# Patient Record
Sex: FEMALE | Race: WHITE
[De-identification: names, ages, dates, MRNs, and addresses within clinical notes are randomized per-mention and may not be internally consistent; named-entity substitution may affect disease eponyms.]

---

## 2019-09-30 ENCOUNTER — HOSPITAL ENCOUNTER (EMERGENCY)
Dept: HOSPITAL 74 - JER | Age: 24
LOS: 1 days | Discharge: HOME | End: 2019-10-01
Payer: SELF-PAY

## 2019-09-30 VITALS — TEMPERATURE: 98.6 F | DIASTOLIC BLOOD PRESSURE: 71 MMHG | SYSTOLIC BLOOD PRESSURE: 128 MMHG

## 2019-09-30 VITALS — BODY MASS INDEX: 28.3 KG/M2

## 2019-09-30 DIAGNOSIS — D27.0: Primary | ICD-10-CM

## 2019-09-30 NOTE — PDOC
History of Present Illness





- General


Chief Complaint: Pain


Stated Complaint: RT ABD PAIN


Time Seen by Provider: 09/30/19 23:18





- History of Present Illness


Initial Comments: 





09/30/19 23:28


23yo F no PMH presents from home c/o 2hrs sudden onset at rest sharp RLQ pain 

nonradiating constant, not better or worse with anything, no pain medications 

tried, atraumatic. Never sexually active, no ob/gyn, never had a pelvic exam, 

no PCP. LMP 2 weeks ago but then appears to have started again today, endorses 

vaginal bleeding filling 1 normal pad today with 1 blood clot, bleeding appears 

similar to other menses but pt has always had menses 4wks apart. Pt has never 

had cramping or pain with menses. Denies vaginal discharge, genital rash or 

irritation, fever, chills, fatigue, headache, dizziness, numbness/tingling, 

weakness, vision changes, shortness of breath, cough, chest pain, palpitations, 

leg swelling, blood in stool, diarrhea, constipation, dysuria, hematuria, 

confusion, sick contacts, recent travel.





Past History





- Past Medical History


Allergies/Adverse Reactions: 


 Allergies











Allergy/AdvReac Type Severity Reaction Status Date / Time


 


No Known Allergies Allergy   Verified 10/01/19 00:41














- Psycho Social/Smoking Cessation Hx


Smoking History: Never smoked





**Review of Systems





- Review of Systems


Comments:: 





10/01/19 02:19





Constitutional: Negative for chills, fever, fatigue, diaphoresis.


HENT: Negative for sore throat, rhinorrhea, congestion.


Eyes: Negative for visual disturbance.


Respiratory: Negative for shortness of breath, cough, and wheezing.


Cardiovascular: Negative for chest pain, palpitations, and leg swelling.


Gastrointestinal: Positive for abdominal pain, nausea, vomiting. Negative for 

abdominal pain, blood in stool, constipation, diarrhea.


Genitourinary: Positive for vaginal bleeding. Negative for dysuria, flank pain, 

and hematuria.


Musculoskeletal: Negative for myalgias, back pain, and neck pain.


Skin: Negative for rash.


Neurological: Negative for light-headedness, dizziness, vertigo, syncope, 

weakness, numbness and headaches.


Psychiatric/Behavioral: Negative for behavioral problems and confusion.








*Physical Exam





- Vital Signs


 Last Vital Signs











Temp Pulse Resp BP Pulse Ox


 


 98.6 F   85   19   128/71   96 


 


 09/30/19 23:05  09/30/19 23:05  09/30/19 23:05  09/30/19 23:05  09/30/19 23:05














- Physical Exam


Comments: 





10/01/19 02:19


Gen: Alert, NAD, comfortable-appearing.


HEENT: PERRL, EOMI, MMM, NCAT. No conjunctival pallor. Sclera are non-icteric.


CV: Regular rate and rhythm. No murmurs, rubs, or gallops.


PULM: No resp distress. CTAB, no wheezes, rales, or rhonchi.


ABD: +RLQ TTP, +slight rebound tenderness, soft, ND, no guarding, no CVA 

tenderness.


BACK: No TTP of c/t/l-spine. No step-offs or deformities.


MSK: No bony deformities. 2+ pulses in all extremities.


NEURO: AAOx3. PERRL. No gross CN deficits. Strength and sensation grossly 

intact throughout.


EXTREMITIES: No cyanosis. No clubbing. No edema. No calf tenderness.


PSYCH: Normal mood and thought pattern.


SKIN: Warm and dry. Normal capillary refill. No rashes. No jaundice








ED Treatment Course





- LABORATORY


CBC & Chemistry Diagram: 


 10/01/19 00:35





 10/01/19 00:35





Medical Decision Making





- Medical Decision Making


09/30/19 23:28


23yo F no PMH presents from home c/o 2hrs sudden onset at rest sharp RLQ pain 

nonradiating constant, not better or worse with anything, no pain medications 

tried, atraumatic. Never sexually active, no ob/gyn, never had a pelvic exam, 

no PCP. LMP 2 weeks ago but then appears to have started again today, endorses 

vaginal bleeding filling 1 normal pad today with 1 blood clot, bleeding appears 

similar to other menses but pt has always had menses 4wks apart. Pt has never 

had cramping or pain with menses. Denies vaginal discharge, genital rash or 

irritation, fever, chills, fatigue, headache, dizziness, numbness/tingling, 

weakness, vision changes, shortness of breath, cough, chest pain, palpitations, 

leg swelling, blood in stool, diarrhea, constipation, dysuria, hematuria, 

confusion, sick contacts, recent travel.





Hemodynamically stable, afebrile, +RLQ pain and TTP with slight rebound 

tenderness. Concern for R ovarian pathology such as ovarian torsion or ruptured 

ovarian cyst - r/o with TVUS. No concern for ectopic pregnancy at this time due 

to virginity - obtain UPreg and reconsider if positive. No concern for STIs or 

PID at this time due to lack of vaginal discharge or irritation and virginity. 

RLQ pain and TTP also concerning for appendicitis or other GI pathology - r/o 

with labs and CTAP. Also consider other infectious etiologies such as UTI, 

anemia, or metabolic derangements - r/o with labs.





-CBC, CMP, Lipase, UA/UC/UPreg


-Tylenol for pain


-TVUS, CTAP w/IV contrast


-Dispo: pending w/u and reassessment





09/30/19 23:58


Tylenol given.


US cannot do transvaginal due to virginity. Cannot see R ovary transabdominally 

- will give IVF and try again. US will assess for appendicitis now.





10/01/19 02:10


Pt feeling better s/p tylenol.


Labs reviewed. Of note, WBC 13.9.





10/01/19 02:20


Signed out to Dr Cruz. Pending urine, US read, and CTAP to r/o ovarian 

pathology and appendicitis.








Discharge





- Follow up/Referral


Referrals: 


ON STAFF,NOT [Primary Care Provider] - 





- Patient Discharge Instructions





- Post Discharge Activity

## 2019-10-01 VITALS — HEART RATE: 86 BPM

## 2019-10-01 LAB
ALBUMIN SERPL-MCNC: 4 G/DL (ref 3.4–5)
ALP SERPL-CCNC: 96 U/L (ref 45–117)
ALT SERPL-CCNC: 38 U/L (ref 13–61)
ANION GAP SERPL CALC-SCNC: 9 MMOL/L (ref 8–16)
APPEARANCE UR: CLEAR
AST SERPL-CCNC: 17 U/L (ref 15–37)
BACTERIA # UR AUTO: 44.4 /HPF
BASOPHILS # BLD: 0.6 % (ref 0–2)
BILIRUB SERPL-MCNC: 0.3 MG/DL (ref 0.2–1)
BILIRUB UR STRIP.AUTO-MCNC: NEGATIVE MG/DL
BUN SERPL-MCNC: 12.3 MG/DL (ref 7–18)
CALCIUM SERPL-MCNC: 9.3 MG/DL (ref 8.5–10.1)
CASTS URNS QL MICRO: 1 /LPF (ref 0–8)
CHLORIDE SERPL-SCNC: 107 MMOL/L (ref 98–107)
CO2 SERPL-SCNC: 25 MMOL/L (ref 21–32)
COLOR UR: YELLOW
CREAT SERPL-MCNC: 0.8 MG/DL (ref 0.55–1.3)
DEPRECATED RDW RBC AUTO: 13.4 % (ref 11.6–15.6)
EOSINOPHIL # BLD: 0.1 % (ref 0–4.5)
EPITH CASTS URNS QL MICRO: 2.3 /HPF
GLUCOSE SERPL-MCNC: 146 MG/DL (ref 74–106)
HCT VFR BLD CALC: 39.2 % (ref 32.4–45.2)
HGB BLD-MCNC: 13.2 GM/DL (ref 10.7–15.3)
KETONES UR QL STRIP: NEGATIVE
LEUKOCYTE ESTERASE UR QL STRIP.AUTO: NEGATIVE
LYMPHOCYTES # BLD: 13.4 % (ref 8–40)
MCH RBC QN AUTO: 29.1 PG (ref 25.7–33.7)
MCHC RBC AUTO-ENTMCNC: 33.6 G/DL (ref 32–36)
MCV RBC: 86.4 FL (ref 80–96)
MONOCYTES # BLD AUTO: 4.6 % (ref 3.8–10.2)
NEUTROPHILS # BLD: 81.3 % (ref 42.8–82.8)
NITRITE UR QL STRIP: NEGATIVE
PH UR: 6.5 [PH] (ref 5–8)
PLATELET # BLD AUTO: 338 K/MM3 (ref 134–434)
PMV BLD: 8.6 FL (ref 7.5–11.1)
POTASSIUM SERPLBLD-SCNC: 3.9 MMOL/L (ref 3.5–5.1)
PROT SERPL-MCNC: 7.9 G/DL (ref 6.4–8.2)
PROT UR QL STRIP: (no result)
PROT UR QL STRIP: NEGATIVE
RBC # BLD AUTO: 207 /HPF (ref 0–4)
RBC # BLD AUTO: 4.54 M/MM3 (ref 3.6–5.2)
SODIUM SERPL-SCNC: 141 MMOL/L (ref 136–145)
SP GR UR: 1.02 (ref 1.01–1.03)
UROBILINOGEN UR STRIP-MCNC: 0.2 MG/DL (ref 0.2–1)
WBC # BLD AUTO: 13.9 K/MM3 (ref 4–10)
WBC # UR AUTO: 3 /HPF (ref 0–5)

## 2019-10-01 NOTE — PDOC
*Physical Exam





- Vital Signs


 Last Vital Signs











Temp Pulse Resp BP Pulse Ox


 


 98.6 F   85   19   128/71   96 


 


 09/30/19 23:05  09/30/19 23:05  09/30/19 23:05  09/30/19 23:05  09/30/19 23:05














ED Treatment Course





- LABORATORY


CBC & Chemistry Diagram: 


 10/01/19 00:35





 10/01/19 00:35





- ADDITIONAL ORDERS


Additional order review: 


 Laboratory  Results











  10/01/19 10/01/19 10/01/19





  02:16 02:16 00:35


 


Sodium   


 


Potassium   


 


Chloride   


 


Carbon Dioxide   


 


Anion Gap   


 


BUN   


 


Creatinine   


 


Est GFR (CKD-EPI)AfAm   


 


Est GFR (CKD-EPI)NonAf   


 


Random Glucose   


 


Calcium   


 


Total Bilirubin   


 


AST   


 


ALT   


 


Alkaline Phosphatase   


 


Total Protein   


 


Albumin   


 


Lipase    95


 


Urine Color   Yellow 


 


Urine Appearance   Clear 


 


Urine pH   6.5 


 


Ur Specific Gravity   1.019 


 


Urine Protein   Trace 


 


Urine Glucose (UA)   Negative 


 


Urine Ketones   Negative 


 


Urine Blood   3+ H 


 


Urine Nitrite   Negative 


 


Urine Bilirubin   Negative 


 


Urine Urobilinogen   0.2 


 


Ur Leukocyte Esterase   Negative 


 


Urine WBC (Auto)   3 


 


Urine RBC (Auto)   207 


 


Urine Casts (Auto)   1 


 


U Epithel Cells (Auto)   2.3 


 


Urine Bacteria (Auto)   44.4 


 


Urine HCG, Qual  Negative  














  10/01/19





  00:35


 


Sodium  141


 


Potassium  3.9


 


Chloride  107


 


Carbon Dioxide  25


 


Anion Gap  9


 


BUN  12.3


 


Creatinine  0.8


 


Est GFR (CKD-EPI)AfAm  120.44


 


Est GFR (CKD-EPI)NonAf  103.92


 


Random Glucose  146 H


 


Calcium  9.3


 


Total Bilirubin  0.3


 


AST  17


 


ALT  38


 


Alkaline Phosphatase  96


 


Total Protein  7.9


 


Albumin  4.0


 


Lipase 


 


Urine Color 


 


Urine Appearance 


 


Urine pH 


 


Ur Specific Gravity 


 


Urine Protein 


 


Urine Glucose (UA) 


 


Urine Ketones 


 


Urine Blood 


 


Urine Nitrite 


 


Urine Bilirubin 


 


Urine Urobilinogen 


 


Ur Leukocyte Esterase 


 


Urine WBC (Auto) 


 


Urine RBC (Auto) 


 


Urine Casts (Auto) 


 


U Epithel Cells (Auto) 


 


Urine Bacteria (Auto) 


 


Urine HCG, Qual 








 











  10/01/19





  00:35


 


RBC  4.54


 


MCV  86.4


 


MCHC  33.6


 


RDW  13.4


 


MPV  8.6


 


Neutrophils %  81.3


 


Lymphocytes %  13.4


 


Monocytes %  4.6


 


Eosinophils %  0.1


 


Basophils %  0.6














Medical Decision Making





- Medical Decision Making





10/01/19 05:09


Sign out received from Dr. Dunne.


24F with no PMH p/w acute onset RLQ pain. 


Pending - US read for r/o torsion vs ovarian cyst, CT A/P for r/o appendicitis





---


US - R sided hemorrhagic ovarian cyst noted. 


Likely cause of abdominal pain. 


CT A/P to r/o appendicitis








10/01/19 06:02


CT read pending





10/01/19 06:23


CT notable for teratoma. As Ms. Hart does not live in [place] and will return to 

[place] this Thursday, will provide disk with CT, CT read, and [place] ob/gyn follow 

up along with careful return precautions to ED. Plan for ob/gyn consult, then 

discharge.  Call placed to ob/gyn. 








Discharge





- Discharge Information


Problems reviewed: Yes


Clinical Impression/Diagnosis: 


Teratoma of ovary


Qualifiers:


 Laterality: right Qualified Code(s): D27.0 - Benign neoplasm of right ovary





Condition: Stable


Disposition: HOME





- Admission


No





- Follow up/Referral


Referrals: 


ON STAFF,NOT [Primary Care Provider] - 


Willa Hubbard MD [Staff Physician] - 





- Patient Discharge Instructions


Additional Instructions: 


You were seen in the emergency departmetn for right sided abdominal pain. Your 

blood work was normal. We found a large right sided ovarian teratoma. Teratomas 

are ovarian masses that are often benign, but need to be worked up further by 

your ob/gyn as it will likely need to be removed surgically. We are giving you 

a referral to an ob/gyn here in [place] if you would like to follow up with one here 

before your return to [place]. Please make sure to follow up as soon as possible, 

within the next seven days. Please return to the emergency department if your 

pain worsens or if you develop any other symptoms that become concerning to 

you. 





- Post Discharge Activity

## 2019-10-01 NOTE — PDOC
Documentation entered by Lalita Vargas SCRIBE, acting as scribe for Marti Cohen MD.








Marti Cohen MD:  This documentation has been prepared by the Alicia cespedes Brenda, SCRIBE, under my direction and personally reviewed by me in its 

entirety.  I confirm that the documentation accurately reflects all work, 

treatment, procedures, and medical decision making performed by me.  





Attending Attestation





- Resident


Resident Name: Ana Maria Barrientos





- ED Attending Attestation


I have performed the following: I have examined & evaluated the patient, The 

case was reviewed & discussed with the resident, I agree w/resident's findings 

& plan, Exceptions are as noted





- HPI


HPI: 





10/01/19 01:47


The patient is a 24 year old female with no significant past medical history 

who presents to the ED with an acute onset of right lower quadrat pain. Patient 

denies ever having been sexually active. 








- Physicial Exam


PE: 





10/01/19 01:49


GENERAL: 


Well-appearing, well-nourished. No apparent distress.


HEENT: 


Normocephalic, atraumatic. PERRL, EOM intact.


CARDIOVASCULAR: 


Normal S1, S2. Regular rate and rhythm.


PULMONARY: 


Clear to auscultation bilaterally.


ABDOMEN: 


RLQ tenderness++


EXTREMITIES: 


Normal ROM in all four extremities. No gross deformities.


SKIN: 


Warm, dry.  No rash


NEUROLOGICAL: 


No focal neurological deficits.





10/01/19 02:27








- Medical Decision Making





10/01/19 02:28





pelvic US:  possibly ruptured ovarian cyst


23 yo female with RLQ pain associated  with nausea  that started today


cbc 13,900


awaiting preg test then ct scan abd/pel to r/o appendicitis